# Patient Record
Sex: MALE | Race: BLACK OR AFRICAN AMERICAN | NOT HISPANIC OR LATINO
[De-identification: names, ages, dates, MRNs, and addresses within clinical notes are randomized per-mention and may not be internally consistent; named-entity substitution may affect disease eponyms.]

---

## 2017-01-13 ENCOUNTER — APPOINTMENT (OUTPATIENT)
Dept: UROLOGY | Facility: CLINIC | Age: 47
End: 2017-01-13

## 2017-09-13 ENCOUNTER — EMERGENCY (EMERGENCY)
Facility: HOSPITAL | Age: 47
LOS: 1 days | Discharge: HOME | End: 2017-09-13
Admitting: EMERGENCY MEDICINE

## 2017-09-13 VITALS
HEART RATE: 90 BPM | TEMPERATURE: 98 F | RESPIRATION RATE: 18 BRPM | DIASTOLIC BLOOD PRESSURE: 84 MMHG | SYSTOLIC BLOOD PRESSURE: 133 MMHG | OXYGEN SATURATION: 98 %
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
## 2017-09-13 DIAGNOSIS — S89.91XA UNSPECIFIED INJURY OF RIGHT LOWER LEG, INITIAL ENCOUNTER: ICD-10-CM
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
## 2017-09-13 NOTE — ED ADULT NURSE NOTE - OBJECTIVE STATEMENT
states pain to knee.  states he sees pain management doctor but he does not give him what he wants.  ambulates with steady gait. pt demanding a hot food tray from ED.  offered sandwich, was told that he does not want that.  wife currently in other bed for same issue.  FERMIN gonzalez

## 2017-09-13 NOTE — ED ADULT NURSE NOTE - PMH
Acute pancreatitis  Pancreatitis  Asthma  Asthma  Atypical bipolar affective disorder  Bipolar 1 disorder  Essential hypertension  HTN (hypertension)  Nondependent cocaine abuse  Cocaine abuse  Type 2 diabetes mellitus  DM (diabetes mellitus)

## 2020-05-11 ENCOUNTER — TRANSCRIPTION ENCOUNTER (OUTPATIENT)
Age: 50
End: 2020-05-11

## 2022-12-22 NOTE — ED ADULT TRIAGE NOTE - SPO2 (%)
per Internal Medicine    70 y o male history of metastatic stage IV lung adenocarcinoma, hypothyroidism, depression, anxiety, undergoing chemo brought in by EMS with HHA for altered mental status Monday morning admitted for sepsis (unclear source) and electrolyte derangements. Now found to have anemia and severe ileus on 12/6, s/p EGD/flex sig 12/9 w/ ulcers, w/ ongoing GOC discussions.        Problem/Plan - 1:  ·  Problem: Left upper extremity swelling.   ·  Plan: Patient complaining of pain in his LUE, new onset swelling  - f/u LUE doppler.    Problem/Plan - 2:  ·  Problem: Anemia.   ·  Plan: Pt with anemia on admission to 9.1. On 12/5, Hb 5.7. Pt asx, mild tachycardia, but normal BP. Unclear source. Pt w/ multiple dark green bowel movements, no overt melena observed. No hemoptysis, no hematuria. Significant bruising on R flank, c/f retroperitoneal bleed but r/o on imaging. Possible GI bleed, GI consulted. Heme onc consulted. Recurrent bleeding.  - CTAP w/o contrast 12/5/22: No retroperitoneal or extraperitoneal hemorrhage. Severe ileus.  - retic index: 1.1 (hypoproliferative), elevated hapto/LDH (hemolysis unlikely), Fe studies showing  - EGD 12/9: Severe circumferential esophagitis s/p bx, Hiatal hernia, Ulcer in hernia sac s/p bx, Ulcer in cardia s/p bx, atrophic gastritis s/p bx  - Biopsy report: ulcers, no H pylori  Plan:  - pantoprazole 40 IV BID  - Heme/Onc recommending solumedrol for immune checkpoint inhibitor colitis.   - c/w solumedrol 40mg IV BID (started 12/15)  - EGD done 12/16: small hiatal hernia, grade C esophagitis (biopsied r/o checkpoint inhibitor esophagitis) Single cratered non-bleeding healing in the cardia, a few cratered non-bleeding healing ulcers in the pylorus, multiple cratered non-bleeding ulcers in the duodenal bulb (biopsied to r/o checkpoint inhibitor duodenitis).   - GI recommending:       - PPI BID for 8 weeks       - repeat EGD in 2 months        - Carafate Suspension 1g po qid for 2 weeks.    Problem/Plan - 3:  ·  Problem: Diarrhea.   ·  Plan: Patient with diarrhea described as loose stool, per outpatient notes seems to be chronic for several weeks (later stated for a year). Patient was prescribed Loperamide early November.  Unclear etiology. May be in setting of metastatic malignancy, however currently considering inflammatory given chronicity and white count. Diarrhea likely not infectious C. diff negative, GI PCR negative. Pt persistently hypokalemic likely from diarrhea. GI consulted on 12/2. Diarrhea also possibly d/t immune-mediated colitis as result of immune checkpoint therapy. EGD on 12/9 showing poor rectal tone.  - CRP high 132; quantitative IgA normal; tissue transglutaminase (TTG) IgA-antibody nml  Plan - RESOLVED.    Problem/Plan - 4:  ·  Problem: Ileus.   ·  Plan: On 12/5 pt found to have distended abdomen. CTAP w/o contrast on 12/5 showing severe ileus. Surgery was formally consulted. Decided not to staff with attending but will continue to follow for serial abdominal exams. Primary team placed sump tube, set to suction. GI also following.  - placed sump tube 12/5  - bladder scan on 12/5, not retaining  - repeat abdominal XR 12/7: abdominal distention w/ improving ileus  - surgery signed off 12/9  Plan:  - restarting minced and moist diet, advance as tolerated.    Problem/Plan - 5:  ·  Problem: ASHLYN (acute kidney injury).   ·  Plan: Patient with ASHLYN on admission BUN 31, Scr 2.35 -->Scr 2.31 (baseline Scr 1.1 10/22). ASHLYN likely prerenal i/s/o hypovolemia due to diarrhea and poor po intake. Given prostate mets, there may also be a post-renal component. Pt w/ mark initially, then removed after passing TOV.   - downtrending Cr 12/9  - c/w maintenance fluids w/ D5 due to low sugars  - strict I/Os  - trend Cr, avoid nephrotoxic drugs, renally dose meds.    Problem/Plan - 6:  ·  Problem: Hypokalemia.   ·  Plan: Patient with hypokalemia, likely due to persistent diarrhea. No EKG changes. On 12/1 K low to 2.1, repeat EKG possible u waves. Pt asymptomatic, no fasciculations or weakness. Aggressively repleted. Diarrhea stopped when pt NPO, and K improved, now worsening w/ worsening diarrhea after diet restarted.  - K replenish, goal >4.    Problem/Plan - 7:  ·  Problem: Sepsis.   ·  Plan: Patient with encephalopathy and hypotension, meeting 3 SIRS on admission for tachycardia, tachypnea, and leukocytosis.  Source possibly pulmonary and urinary.  Lactate 12.1 --> 2.3, UA trace LE, Bacteria present, hyaline casts, C. Diff negative GI PCR negative.  Lactate cleared, S/p Zosyn 3.375 x 3, Vancomycin 1250mg, NaCl 2200cc bolus in ED.   Still unclear source. AAOx4, mentating well since 11/29.  - UCx no growth final  - BCx NGTD  - ULeg negative  - s/p Vanc 1000mg qD and Zosyn 3.375 q8 (11/28-12/1)  - repeat BCx/UA w/ reflex culture NGTD  Plan:  - continue to trend WBC.    Problem/Plan - 8:  ·  Problem: Hypothyroidism.   ·  Plan: Patient with history of hypothyroidism, home medications Levothyroxine 75mcg daily. On 12/1 TSH 10.81, free T4 0.67.  - c/w synthroid 88mcg qD.    Problem/Plan - 9:  ·  Problem: Falls.   ·  Plan: Patient with frequent falls at home, per outpatient nursing notes, patient's HHA have reported falls at home sometimes with head trauma.  He ambulated at baseline with cane.  Etiology likely due to overall decompensation in setting of metastatic cancer. CTH and CT cervical spine without fractures of trauma.    - PT/OT consulted - recommending SASHA  - OOBTC daily w/ supervision.    Problem/Plan - 10:  ·  Problem: Adult failure to thrive.   ·  Plan; Patient with underlying metastatic lung adenocarcinoma with chronic diarrhea and frequent falls, overall appears very frail.    - dietitian consulted - rec ensure enlive TID  - PT/OT consulted, rec SASHA  - palliative consult: DNR/DNI; palliative to speak w/ Dr. Patricia for possible hospice  - contact family  - incentive spirometry.    Problem/Plan - 11:  ·  Problem: Hypomagnesemia.   ·  Plan: Patient with hypomagnesemia, likely due to persistent diarrhea.  Likely contributing to overall weakness and falls.  - Replenish PRN.    Problem/Plan - 12:  ·  Problem: Hypophosphatemia.   ·  Plan: Patient with hypophosphatemia, likely due to persistent diarrhea.  Likely contributing to overall weakness and falls.  - Replenish PRN.   98

## 2023-03-30 ENCOUNTER — EMERGENCY (EMERGENCY)
Facility: HOSPITAL | Age: 53
LOS: 1 days | End: 2023-03-30
Attending: EMERGENCY MEDICINE | Admitting: EMERGENCY MEDICINE
Payer: MEDICAID

## 2023-03-30 VITALS — TEMPERATURE: 97 F

## 2023-03-30 DIAGNOSIS — I46.9 CARDIAC ARREST, CAUSE UNSPECIFIED: ICD-10-CM

## 2023-03-30 DIAGNOSIS — Z91.048 OTHER NONMEDICINAL SUBSTANCE ALLERGY STATUS: ICD-10-CM

## 2023-03-30 DIAGNOSIS — Z20.822 CONTACT WITH AND (SUSPECTED) EXPOSURE TO COVID-19: ICD-10-CM

## 2023-03-30 DIAGNOSIS — Z91.013 ALLERGY TO SEAFOOD: ICD-10-CM

## 2023-03-30 LAB — SARS-COV-2 RNA SPEC QL NAA+PROBE: SIGNIFICANT CHANGE UP

## 2023-03-30 PROCEDURE — 99285 EMERGENCY DEPT VISIT HI MDM: CPT | Mod: 25

## 2023-03-30 PROCEDURE — 92950 HEART/LUNG RESUSCITATION CPR: CPT

## 2023-03-30 NOTE — ED ADULT NURSE NOTE - OBJECTIVE STATEMENT
BIBEMS s/p found down at bottom of stairs by friends. Unknown down time. CPR initiated at approx. 2030 by EMS. given 3 rounds of Epi, 2mg narcan IN, and intubated with 7.0 ETT tube. F/s in field approx. 200. 20 G in place to left forearm by EMS. Per ems, asystole the entire time.   Arrives to ed intubated, CPR assistive device in progress.  Please see associated code flow sheet for interventions performed by team.  TOD 2123 pronounced by MD Do

## 2023-03-30 NOTE — ED ADULT TRIAGE NOTE - CHIEF COMPLAINT QUOTE
Pt BIBA from apartment building, EMS states pt was found unresponsive and pulseless w/ unknown downtime. EMS states pt was given "three" doses of IVP epinephrine, and 2mg of intranasal Narcan with no change in condition. CPR in progress via [employer] cpr machine upon pt arrival to ED. STAT team to [facility].

## 2023-03-30 NOTE — ED PROVIDER NOTE - PHYSICAL EXAMINATION
Gen: in cardiac arrest  HEENT: pupils fixed b/l. no scalp hematoma.   CV: absent femoral pulses b/l.   Resp: intubated  GI: Abdomen soft non-distended  MSK: No open wounds, no bruising, no LE edema  Neuro: no spontaneous movements of extremities, no posturing.

## 2023-03-30 NOTE — ED ADULT NURSE NOTE - CHIEF COMPLAINT QUOTE
Pt BIBA from apartment building, EMS states pt was found unresponsive and pulseless w/ unknown downtime. EMS states pt was given "three" doses of IVP epinephrine, and 2mg of intranasal Narcan with no change in condition. CPR in progress via [employer] cpr machine upon pt arrival to ED. STAT team to [facility].

## 2023-03-30 NOTE — ED PROVIDER NOTE - OBJECTIVE STATEMENT
51YO M pmhx crack/cocaine use, p/w unresponsive state. BIBA, pt arrives intubated with R forearm IV. per EMS, CPR for approximately 40m, unclear down time PTA. pt persistently in PEA, never received shocks, received epinephrine x3, intranasal narcan 2mg x 2 with EMS. pt arrives with ETT in place, placement confirmed with glidescope by ED team. Initial FS by EMS >100. Pt had no signs of trauma on exam, and per EMS no signs of trauma/external bleeding on scene. While in ED, pt received 1 amp bicarb, narcan IV push x1, calcium chloride x1, D50 for FS 60s, epi x 3. pt persistently in PEA arrest. TOD: 9:23pm. 51YO M pmhx crack/cocaine use, p/w unresponsive state. BIBA, pt arrives intubated with R forearm IV. per EMS, CPR for approximately 40m, unclear down time PTA. pt persistently in asystole, never received shocks, received epinephrine x3, intranasal narcan 2mg x 2 with EMS. pt arrives with ETT in place, placement confirmed with glidescope by ED team. Initial FS by EMS >100. Pt had no signs of trauma on exam, and per EMS no signs of trauma/external bleeding on scene. While in ED, pt received 1 amp bicarb, narcan IV push x1, calcium chloride x1, D50 for FS 60s, epi x 3. pt persistently in asystole arrest. TOD: 9:23pm.

## 2023-03-30 NOTE — ED PROVIDER NOTE - CLINICAL SUMMARY MEDICAL DECISION MAKING FREE TEXT BOX
Alanna Saucedo MD, PGY-3: 51YO M pmhx crack/cocaine use, p/w unresponsive state, BIBA in PEA arrest x40m, unknown duration PTA by EMS. pt received multiple rounds of epi, iv and intranasal narcan, bicarb, calcium chloride, 1x D50, no improvement in VS. definitive airway placed by EMS. TOD at 9:23pm. Alanna Saucedo MD, PGY-3: 53YO M pmhx crack/cocaine use, p/w unresponsive state, BIBA in PEA arrest x40m, unknown duration PTA by EMS. pt received multiple rounds of epi, iv and intranasal narcan, bicarb, calcium chloride, 1x D50, no improvement in VS. definitive airway placed by EMS. ETCO2 <15 persistently. No signs of cardiac activity with bedside echo during pulse checks. TOD at 9:23pm.

## 2023-03-30 NOTE — ED PROVIDER NOTE - PROGRESS NOTE DETAILS
Yesenia Ruiz (wife): [phone]  Spoke with wife who states that she is unaware of any medical problems the patient had. [facility] called. Will call back once ME available to discuss case.

## 2023-03-31 NOTE — ED POST DISCHARGE NOTE - OTHER COMMUNICATION
Patient's wife Yesenia Torres and daughter Shaunna Bartholomew came and picked up patient's belongings. Confirmed wallet, ID, and credit card present.